# Patient Record
Sex: FEMALE | Race: WHITE | NOT HISPANIC OR LATINO | Employment: OTHER | URBAN - NONMETROPOLITAN AREA
[De-identification: names, ages, dates, MRNs, and addresses within clinical notes are randomized per-mention and may not be internally consistent; named-entity substitution may affect disease eponyms.]

---

## 2024-07-29 ENCOUNTER — OFFICE VISIT (OUTPATIENT)
Dept: ENDOCRINOLOGY | Facility: CLINIC | Age: 54
End: 2024-07-29
Payer: COMMERCIAL

## 2024-07-29 VITALS
BODY MASS INDEX: 21.01 KG/M2 | WEIGHT: 138.6 LBS | SYSTOLIC BLOOD PRESSURE: 119 MMHG | HEART RATE: 83 BPM | DIASTOLIC BLOOD PRESSURE: 68 MMHG | HEIGHT: 68 IN | OXYGEN SATURATION: 99 %

## 2024-07-29 DIAGNOSIS — E10.65 TYPE 1 DIABETES MELLITUS WITH HYPERGLYCEMIA: Primary | ICD-10-CM

## 2024-07-29 LAB
EXPIRATION DATE: ABNORMAL
GLUCOSE BLDC GLUCOMTR-MCNC: 235 MG/DL (ref 70–130)
HBA1C MFR BLD: 10.6 % (ref 4.5–5.7)
Lab: ABNORMAL

## 2024-07-29 PROCEDURE — 83036 HEMOGLOBIN GLYCOSYLATED A1C: CPT | Performed by: NURSE PRACTITIONER

## 2024-07-29 PROCEDURE — 82043 UR ALBUMIN QUANTITATIVE: CPT | Performed by: NURSE PRACTITIONER

## 2024-07-29 PROCEDURE — 1160F RVW MEDS BY RX/DR IN RCRD: CPT | Performed by: NURSE PRACTITIONER

## 2024-07-29 PROCEDURE — 1159F MED LIST DOCD IN RCRD: CPT | Performed by: NURSE PRACTITIONER

## 2024-07-29 PROCEDURE — 3046F HEMOGLOBIN A1C LEVEL >9.0%: CPT | Performed by: NURSE PRACTITIONER

## 2024-07-29 PROCEDURE — 99204 OFFICE O/P NEW MOD 45 MIN: CPT | Performed by: NURSE PRACTITIONER

## 2024-07-29 PROCEDURE — 82947 ASSAY GLUCOSE BLOOD QUANT: CPT | Performed by: NURSE PRACTITIONER

## 2024-07-29 PROCEDURE — 82570 ASSAY OF URINE CREATININE: CPT | Performed by: NURSE PRACTITIONER

## 2024-07-29 RX ORDER — INSULIN GLARGINE 100 [IU]/ML
20 INJECTION, SOLUTION SUBCUTANEOUS NIGHTLY
COMMUNITY

## 2024-07-29 RX ORDER — PRASUGREL 10 MG/1
10 TABLET, FILM COATED ORAL DAILY
COMMUNITY

## 2024-07-29 RX ORDER — ASPIRIN 81 MG/1
81 TABLET ORAL DAILY
COMMUNITY

## 2024-07-29 RX ORDER — SACUBITRIL AND VALSARTAN 97; 103 MG/1; MG/1
1 TABLET, FILM COATED ORAL 2 TIMES DAILY
COMMUNITY

## 2024-07-29 RX ORDER — BLOOD-GLUCOSE SENSOR
EACH MISCELLANEOUS SEE ADMIN INSTRUCTIONS
COMMUNITY
Start: 2024-07-10

## 2024-07-29 RX ORDER — TIZANIDINE 4 MG/1
4 TABLET ORAL NIGHTLY PRN
COMMUNITY

## 2024-07-29 RX ORDER — CARVEDILOL 6.25 MG/1
6.25 TABLET ORAL 2 TIMES DAILY WITH MEALS
COMMUNITY

## 2024-07-29 RX ORDER — GABAPENTIN 300 MG/1
300 CAPSULE ORAL 2 TIMES DAILY
COMMUNITY

## 2024-07-29 RX ORDER — ISOSORBIDE MONONITRATE 30 MG/1
30 TABLET, EXTENDED RELEASE ORAL DAILY
COMMUNITY

## 2024-07-29 RX ORDER — HYDROCODONE BITARTRATE AND ACETAMINOPHEN 7.5; 325 MG/1; MG/1
1 TABLET ORAL EVERY 8 HOURS PRN
COMMUNITY

## 2024-07-29 NOTE — ASSESSMENT & PLAN NOTE
-Diabetes is above goal with A1c 10.6.  -Discussed dietary and exercise guidelines with patient.  -Discussed the importance of yearly eye exams.  -Discussed the importance of checking BG's regularly.    -Discussed the use of insulin pump with CGM with patient.  Discussed that she would likely be more successful with a CGM that is compatible to her pump.  Will reach out to "[employer]" rep to see if she will be able to continue using the system due to her change with insurance.  If she is able to will consider updating pump to 780 G with a sensor that is compatible.  If she is not able to continue with Medtronic due to cost, we will consider placing her on OmniPod with Dexcom system.  Discussed this with patient and she is agreeable.  -Discussed the function of insulin pump as far as basal and bolus features.  -Discontinue Lantus.  -Adjustments to insulin pump settings as below:  Basal:  0000: 1.00 u/hr  0500: 2.0 u/hr  1100: 2 u/hr  1900: 2 u/hr  CR: 12  ISF: 40  -S/S hypoglycemia reviewed with Rule of 15's advised.  -Follow-up in 3 months.

## 2024-07-29 NOTE — PROGRESS NOTES
Chief Complaint   Patient presents with    Diabetes        Referring Provider  Paddy France, DO     HPI   Catherine Peguero is a 53 y.o. female had concerns including Diabetes.    Seen as a new patient.  T1DM.    [place] recently moved here for [place].  She was seeing endo in [place] and was started on a Medtronic 770 G insulin pump prior to moving here.  She reports that she was quickly trained in office and has been doing the best that she can with managing her diabetes with the use of his pump.  When she moved to [place] she started with primary care.  They referred her to Dr. Reyes she was not happy with this as he removed her from the insulin pump and put her back on injections.  She went back to PCP and did not want to continue with injections, but her PCP was not comfortable managing her insulin pump.  They started her on long-acting insulin with insulin pump prior to getting her scheduled to see us for management of her diabetes.  She reports that she has constant high blood sugars and has not been able to get it under control recently.  She reports that when she lived in [place] her  was unable to help her get her insulin supplies due to the cost.  She reports that she now has no income and is not sure that she will be able to continue use with this insulin pump.    Diabetes:  Diabetes was diagnosed at age 25.  Complications include neuropathy.  Last ophtho exam was 2024.  Current medications for diabetes include Lantus 20 units nightly, Humalog in the insulin pump.  She checks her blood sugar Gustabo 3 CGM times per day.   Hypos:rarely    ACE/ARB:yes, Statin: yes  Labs:  Lipid Panel: utd  GABBY: utd    The following portions of the patient's history were reviewed and updated as appropriate: allergies, current medications, past family history, past medical history, past social history, past surgical history, and problem list.    Diet: she tries to limit her carbs and sugars    History reviewed. No pertinent past medical  "history.  History reviewed. No pertinent surgical history.   History reviewed. No pertinent family history.   Social History     Socioeconomic History    Marital status:    Tobacco Use    Smoking status: Former     Types: Cigarettes     Passive exposure: Past    Smokeless tobacco: Never   Substance and Sexual Activity    Alcohol use: Not Currently     Comment: Pt stated has a drink \"3 times a year\"    Drug use: Never    Sexual activity: Defer      No Known Allergies   Current Outpatient Medications on File Prior to Visit   Medication Sig Dispense Refill    aspirin 81 MG EC tablet Take 1 tablet by mouth Daily.      carvedilol (COREG) 6.25 MG tablet Take 1 tablet by mouth 2 (Two) Times a Day With Meals.      Continuous Glucose Sensor (FreeStyle Gustabo 3 Sensor) misc See Admin Instructions.      gabapentin (NEURONTIN) 300 MG capsule Take 1 capsule by mouth 2 (Two) Times a Day.      HYDROcodone-acetaminophen (NORCO) 7.5-325 MG per tablet Take 1 tablet by mouth Every 8 (Eight) Hours As Needed for Moderate Pain.      insulin glargine (LANTUS, SEMGLEE) 100 UNIT/ML injection Inject 20 Units under the skin into the appropriate area as directed Every Night.      insulin regular (humuLIN R,novoLIN R) 100 UNIT/ML injection Inject  under the skin into the appropriate area as directed. Use in Pump   Uses 3mL/every 3 days      isosorbide mononitrate (IMDUR) 30 MG 24 hr tablet Take 1 tablet by mouth Daily.      prasugrel (EFFIENT) 10 MG tablet Take 1 tablet by mouth Daily.      rosuvastatin (CRESTOR) 20 MG tablet Take 40 mg by mouth Every Night.      sacubitril-valsartan (Entresto)  MG tablet Take 1 tablet by mouth 2 (Two) Times a Day.      tiZANidine (ZANAFLEX) 4 MG tablet Take 1 tablet by mouth At Night As Needed for Muscle Spasms.       No current facility-administered medications on file prior to visit.        Review of Systems   Constitutional:  Positive for fatigue. Negative for unexpected weight loss.   Eyes:  " "Positive for blurred vision.   Endocrine: Positive for polydipsia, polyphagia and polyuria.   Psychiatric/Behavioral:  Positive for sleep disturbance.    All other systems reviewed and are negative.    /68 (BP Location: Right arm, Patient Position: Sitting, Cuff Size: Adult)   Pulse 83   Ht 172.7 cm (68\")   Wt 62.9 kg (138 lb 9.6 oz)   SpO2 99%   BMI 21.07 kg/m²      Physical Exam  Vitals reviewed.   Constitutional:       Appearance: Normal appearance.   Eyes:      Extraocular Movements: Extraocular movements intact.   Cardiovascular:      Rate and Rhythm: Normal rate.      Pulses:           Dorsalis pedis pulses are 2+ on the right side and 2+ on the left side.        Posterior tibial pulses are 2+ on the right side and 2+ on the left side.   Pulmonary:      Effort: Pulmonary effort is normal.   Feet:      Right foot:      Protective Sensation: 10 sites tested.  10 sites sensed.      Skin integrity: Callus and dry skin present.      Toenail Condition: Right toenails are normal.      Left foot:      Protective Sensation: 10 sites tested.  10 sites sensed.      Skin integrity: Callus and dry skin present.      Toenail Condition: Left toenails are normal.      Comments: Diabetic Foot Exam Performed and Monofilament Test Performed  Neurological:      General: No focal deficit present.      Mental Status: She is alert and oriented to person, place, and time.   Psychiatric:         Mood and Affect: Mood normal.         Behavior: Behavior normal.         Thought Content: Thought content normal.         Judgment: Judgment normal.       CMP:     Lipid Panel:  No results found for: \"CHOL\", \"TRIG\", \"HDL\", \"VLDL\", \"LDL\"  HbA1c:  Lab Results   Component Value Date    HGBA1C 10.6 (A) 07/29/2024     Glucose:  Lab Results   Component Value Date    POCGLU 235 (A) 07/29/2024     Microalbumin:  No results found for: \"MALBCRERATIO\"  TSH:  No results found for: \"TSH\"    Assessment and Plan    Diagnoses and all orders for " this visit:    1. Type 1 diabetes mellitus with hyperglycemia (Primary)  Assessment & Plan:  -Diabetes is above goal with A1c 10.6.  -Discussed dietary and exercise guidelines with patient.  -Discussed the importance of yearly eye exams.  -Discussed the importance of checking BG's regularly.    -Discussed the use of insulin pump with CGM with patient.  Discussed that she would likely be more successful with a CGM that is compatible to her pump.  Will reach out to [employer] rep to see if she will be able to continue using the system due to her change with insurance.  If she is able to will consider updating pump to 780 G with a sensor that is compatible.  If she is not able to continue with Medtronic due to cost, we will consider placing her on OmniPod with Dexcom system.  Discussed this with patient and she is agreeable.  -Discussed the function of insulin pump as far as basal and bolus features.  -Discontinue Lantus.  -Adjustments to insulin pump settings as below:  Basal:  0000: 1.00 u/hr  0500: 2.0 u/hr  1100: 2 u/hr  1900: 2 u/hr  CR: 12  ISF: 40  -S/S hypoglycemia reviewed with Rule of 15's advised.  -Follow-up in 3 months.    Orders:  -     POC Glycosylated Hemoglobin (Hb A1C)  -     POC Glucose, Blood  -     Microalbumin / Creatinine Urine Ratio - Urine, Clean Catch         Return in about 3 months (around 10/29/2024) for Follow-up appointment, A1C. The patient was instructed to contact the clinic with any interval questions or concerns.        This document has been electronically signed by KALE Harper  July 29, 2024 13:44 EDT   Endocrinology    Please note that portions of this document were completed with a voice recognition program. Efforts were made to edit the dictations, but occasionally words are mis-transcribed.

## 2024-07-30 LAB
ALBUMIN UR-MCNC: 2.5 MG/DL
CREAT UR-MCNC: 120.3 MG/DL
MICROALBUMIN/CREAT UR: 20.8 MG/G (ref 0–29)

## 2024-12-03 ENCOUNTER — OFFICE VISIT (OUTPATIENT)
Dept: ENDOCRINOLOGY | Facility: CLINIC | Age: 54
End: 2024-12-03
Payer: COMMERCIAL

## 2024-12-03 VITALS
HEART RATE: 83 BPM | SYSTOLIC BLOOD PRESSURE: 125 MMHG | BODY MASS INDEX: 21.26 KG/M2 | WEIGHT: 139.8 LBS | DIASTOLIC BLOOD PRESSURE: 49 MMHG | OXYGEN SATURATION: 97 %

## 2024-12-03 DIAGNOSIS — E10.65 TYPE 1 DIABETES MELLITUS WITH HYPERGLYCEMIA: Primary | ICD-10-CM

## 2024-12-03 LAB
EXPIRATION DATE: ABNORMAL
HBA1C MFR BLD: 9.8 % (ref 4.5–5.7)
Lab: ABNORMAL
T3FREE SERPL-MCNC: 2.98 PG/ML (ref 2–4.4)
T4 FREE SERPL-MCNC: 0.99 NG/DL (ref 0.92–1.68)
TSH SERPL DL<=0.05 MIU/L-ACNC: 0.08 UIU/ML (ref 0.27–4.2)

## 2024-12-03 PROCEDURE — 84439 ASSAY OF FREE THYROXINE: CPT | Performed by: NURSE PRACTITIONER

## 2024-12-03 PROCEDURE — 84443 ASSAY THYROID STIM HORMONE: CPT | Performed by: NURSE PRACTITIONER

## 2024-12-03 PROCEDURE — 86376 MICROSOMAL ANTIBODY EACH: CPT | Performed by: NURSE PRACTITIONER

## 2024-12-03 PROCEDURE — 84445 ASSAY OF TSI GLOBULIN: CPT | Performed by: NURSE PRACTITIONER

## 2024-12-03 PROCEDURE — 84481 FREE ASSAY (FT-3): CPT | Performed by: NURSE PRACTITIONER

## 2024-12-03 PROCEDURE — 86800 THYROGLOBULIN ANTIBODY: CPT | Performed by: NURSE PRACTITIONER

## 2024-12-03 PROCEDURE — 83520 IMMUNOASSAY QUANT NOS NONAB: CPT | Performed by: NURSE PRACTITIONER

## 2024-12-03 RX ORDER — ACYCLOVIR 400 MG/1
1 TABLET ORAL
Qty: 3 EACH | Refills: 5 | Status: SHIPPED | OUTPATIENT
Start: 2024-12-03

## 2024-12-03 RX ORDER — INSULIN PMP CART,AUT,G6/7,CNTR
1 EACH SUBCUTANEOUS TAKE AS DIRECTED
Qty: 1 KIT | Refills: 0 | Status: SHIPPED | OUTPATIENT
Start: 2024-12-03

## 2024-12-03 RX ORDER — GLUCAGON 1 MG
1 KIT INJECTION AS NEEDED
Qty: 1 EACH | Refills: 2 | Status: SHIPPED | OUTPATIENT
Start: 2024-12-03

## 2024-12-03 RX ORDER — INSULIN LISPRO 100 [IU]/ML
INJECTION, SOLUTION INTRAVENOUS; SUBCUTANEOUS
Qty: 30 ML | Refills: 5 | Status: SHIPPED | OUTPATIENT
Start: 2024-12-03

## 2024-12-03 NOTE — PROGRESS NOTES
Chief Complaint   Patient presents with    Diabetes     F/u dm1, wanted the ominipod instead of medtronic.        Referring Provider  No ref. provider found     HPI   Catherine Peguero is a 54 y.o. female had concerns including Diabetes (F/u dm1, wanted the ominipod instead of medtronic.).     T1DM.    She reports that she is currently using a Medtronic pump with Gustabo CGM.  She would like to transition to Omnipod insulin pump so that she does not have to continuing with tubing and would like have a system that connects to help better control her BG's.    She is complaining of pain, grittiness, redness, watering, dryness and swelling to the left eye. She reports that this has been like this for the last year or so.    History:  [place] recently moved here for [place].  She was seeing [facility] in [place] and was started on a Medtronic 770 G insulin pump prior to moving here.  She reports that she was quickly trained in office and has been doing the best that she can with managing her diabetes with the use of his pump.  When she moved to [place] she started with primary care.  They referred her to Dr. Reyes she was not happy with this as he removed her from the insulin pump and put her back on injections.  She went back to PCP and did not want to continue with injections, but her PCP was not comfortable managing her insulin pump.  They started her on long-acting insulin with insulin pump prior to getting her scheduled to see us for management of her diabetes.  She reports that she has constant high blood sugars and has not been able to get it under control recently.  She reports that when she lived in [place] her  was unable to help her get her insulin supplies due to the cost.  She reports that she now has no income and is not sure that she will be able to continue use with this insulin pump.    Diabetes:  Diabetes was diagnosed at age 25.  Complications include neuropathy.  Last ophtho exam was 2024.  Current medications for diabetes  "include insulin in a Medtronic insulin pump.  She checks her blood sugar Gustabo 3 CGM times per day.   Hypos:rarely    ACE/ARB:yes, Statin: yes  Labs:  Lipid Panel: utd  GABBY: utd    The following portions of the patient's history were reviewed and updated as appropriate: allergies, current medications, past family history, past medical history, past social history, past surgical history, and problem list.    Diet: she tries to limit her carbs and sugars    History reviewed. No pertinent past medical history.  History reviewed. No pertinent surgical history.   Family History   Family history unknown: Yes      Social History     Socioeconomic History    Marital status:    Tobacco Use    Smoking status: Former     Types: Cigarettes     Passive exposure: Past    Smokeless tobacco: Never   Vaping Use    Vaping status: Never Used   Substance and Sexual Activity    Alcohol use: Not Currently     Comment: Pt stated has a drink \"3 times a year\"    Drug use: Never    Sexual activity: Defer      No Known Allergies   Current Outpatient Medications on File Prior to Visit   Medication Sig Dispense Refill    carvedilol (COREG) 6.25 MG tablet Take 1 tablet by mouth 2 (Two) Times a Day With Meals.      Continuous Glucose Sensor (FreeStyle Gustabo 3 Sensor) misc See Admin Instructions.      gabapentin (NEURONTIN) 300 MG capsule Take 1 capsule by mouth 2 (Two) Times a Day.      HYDROcodone-acetaminophen (NORCO) 7.5-325 MG per tablet Take 1 tablet by mouth Every 8 (Eight) Hours As Needed for Moderate Pain.      insulin regular (humuLIN R,novoLIN R) 100 UNIT/ML injection Inject  under the skin into the appropriate area as directed. Use in Pump   Uses 3mL/every 3 days      isosorbide mononitrate (IMDUR) 30 MG 24 hr tablet Take 1 tablet by mouth Daily.      prasugrel (EFFIENT) 10 MG tablet Take 1 tablet by mouth Daily.      rosuvastatin (CRESTOR) 20 MG tablet Take 40 mg by mouth Every Night.      sacubitril-valsartan (Entresto)  " "MG tablet Take 1 tablet by mouth 2 (Two) Times a Day.      tiZANidine (ZANAFLEX) 4 MG tablet Take 1 tablet by mouth At Night As Needed for Muscle Spasms.      aspirin 81 MG EC tablet Take 1 tablet by mouth Daily. (Patient not taking: Reported on 12/3/2024)      insulin glargine (LANTUS, SEMGLEE) 100 UNIT/ML injection Inject 20 Units under the skin into the appropriate area as directed Every Night. (Patient not taking: Reported on 12/3/2024)       No current facility-administered medications on file prior to visit.        Review of Systems   Constitutional:  Positive for fatigue. Negative for unexpected weight loss.   Eyes:  Positive for blurred vision, pain, discharge and itching.   Endocrine: Positive for polydipsia, polyphagia and polyuria.   Psychiatric/Behavioral:  Positive for sleep disturbance.    All other systems reviewed and are negative.    /49 (BP Location: Right arm, Patient Position: Sitting, Cuff Size: Adult)   Pulse 83   Wt 63.4 kg (139 lb 12.8 oz)   SpO2 97%   Breastfeeding No   BMI 21.26 kg/m²      Physical Exam  Vitals reviewed.   Constitutional:       Appearance: Normal appearance.   Eyes:      Extraocular Movements: Extraocular movements intact.   Cardiovascular:      Rate and Rhythm: Normal rate.   Pulmonary:      Effort: Pulmonary effort is normal.   Neurological:      General: No focal deficit present.      Mental Status: She is alert and oriented to person, place, and time.   Psychiatric:         Mood and Affect: Mood normal.         Behavior: Behavior normal.         Thought Content: Thought content normal.         Judgment: Judgment normal.       CMP:  Lab Results   Component Value Date    Glucose 235 (A) 07/29/2024    Glucose 128 (H) 01/17/2023    Creatinine, Urine 120.3 07/29/2024     Lipid Panel:  No results found for: \"CHOL\", \"TRIG\", \"HDL\", \"VLDL\", \"LDL\"  HbA1c:  Lab Results   Component Value Date    HGBA1C 9.8 (A) 12/03/2024     Glucose:  Lab Results   Component Value Date " "   POCGLU 235 (A) 07/29/2024     Microalbumin:  Lab Results   Component Value Date    CHRIS 20.8 07/29/2024     TSH:  No results found for: \"TSH\"    Assessment and Plan    Diagnoses and all orders for this visit:    1. Type 1 diabetes mellitus with hyperglycemia (Primary)  Assessment & Plan:  -Diabetes is above goal with A1c 9.8.  -Discussed dietary and exercise guidelines with patient.  -Discussed the importance of yearly eye exams.  -Discussed the importance of checking BG's regularly.    -Discussed the use of insulin pump with CGM with patient. She would like to start using Omnipod with Dexcom G7.  2 week data download Gustabo CGM:  2 week data download is as follows:  Very High: 54%  High:18%  In Range:25%  Low: 2%  Very Low: 1%  Trend shows overall hypers with post meal hypers.  -Continue with insulin pump settings as below:  Basal:  0000: 1.00 u/hr  0500: 2.0 u/hr  1100: 2 u/hr  1900: 2 u/hr  CR: 12  ISF: 40  -RX for Omnipod and Dexcom sent to pharmacy.  Will send information to [employer]  to have trained and started on this system.  -S/S hypoglycemia reviewed with Rule of 15's advised.  -Follow-up in 3 months.    Orders:  -     POC Glycosylated Hemoglobin (Hb A1C)  -     Insulin Disposable Pump (Omnipod 5 [id number] Intro Gen 5) kit; Use 1 kit Take As Directed.  Dispense: 1 kit; Refill: 0  -     Continuous Glucose Sensor (Dexcom G7 Sensor) misc; Use 1 each Every 10 (Ten) Days.  Dispense: 3 each; Refill: 5  -     TSH  -     T4, free  -     T3, Free  -     Thyroid Stimulating Immunoglobulin  -     Thyrotropin Receptor Antibody  -     Thyroid Antibodies    Other orders  -     Insulin Lispro (HumaLOG) 100 UNIT/ML injection; For use in insulin pump.    Dispense: 30 mL; Refill: 5  -     Glucagon HCl (Glucagon Emergency) 1 MG/ML reconstituted solution; Inject 1 mL as directed As Needed (for severe hypoglycemia).  Dispense: 1 each; Refill: 2         Return in about 3 months (around 3/3/2025) for " Follow-up appointment, A1C. The patient was instructed to contact the clinic with any interval questions or concerns.        This document has been electronically signed by KALE Harper  December 3, 2024 12:55 EST   Endocrinology    Please note that portions of this document were completed with a voice recognition program. Efforts were made to edit the dictations, but occasionally words are mis-transcribed.

## 2024-12-03 NOTE — ASSESSMENT & PLAN NOTE
-Diabetes is above goal with A1c 9.8.  -Discussed dietary and exercise guidelines with patient.  -Discussed the importance of yearly eye exams.  -Discussed the importance of checking BG's regularly.    -Discussed the use of insulin pump with CGM with patient. She would like to start using Omnipod with Dexcom G7.  2 week data download Gustabo CGM:  2 week data download is as follows:  Very High: 54%  High:18%  In Range:25%  Low: 2%  Very Low: 1%  Trend shows overall hypers with post meal hypers.  -Continue with insulin pump settings as below:  Basal:  0000: 1.00 u/hr  0500: 2.0 u/hr  1100: 2 u/hr  1900: 2 u/hr  CR: 12  ISF: 40  -RX for Omnipod and Dexcom sent to pharmacy.  Will send information to [employer]  to have trained and started on this system.  -S/S hypoglycemia reviewed with Rule of 15's advised.  -Follow-up in 3 months.

## 2024-12-04 ENCOUNTER — TELEPHONE (OUTPATIENT)
Dept: ENDOCRINOLOGY | Facility: CLINIC | Age: 54
End: 2024-12-04

## 2024-12-04 LAB
THYROGLOB AB SERPL-ACNC: <1 IU/ML (ref 0–0.9)
THYROPEROXIDASE AB SERPL-ACNC: 20 IU/ML (ref 0–34)
TSH RECEP AB SER-ACNC: <1.1 IU/L (ref 0–1.75)

## 2024-12-04 NOTE — TELEPHONE ENCOUNTER
Please let her know that the Omnipod trainers make their own schedule and they will contact her with this information.  Will you check to make sure she is on [employer]'s list?

## 2024-12-04 NOTE — TELEPHONE ENCOUNTER
Caller: Catherine Peguero    Relationship to patient: Self    Best call back number: [phone]     Patient is needing: PT REQUESTS EDUCATION VISIT FOR OMNIPOD 5 ON 12/4-12/5/24 IN [place] OFFICE. PLEASE CALL TO ADVISE.

## 2024-12-05 LAB — TSI SER-ACNC: <0.1 IU/L (ref 0–0.55)

## 2024-12-06 DIAGNOSIS — R79.89 LOW TSH LEVEL: Primary | ICD-10-CM

## 2024-12-06 DIAGNOSIS — E05.90 HYPERTHYROIDISM: ICD-10-CM

## 2025-01-07 DIAGNOSIS — E10.65 TYPE 1 DIABETES MELLITUS WITH HYPERGLYCEMIA: ICD-10-CM

## 2025-01-07 RX ORDER — INSULIN LISPRO 100 [IU]/ML
INJECTION, SOLUTION INTRAVENOUS; SUBCUTANEOUS
Qty: 30 ML | Refills: 5 | Status: SHIPPED | OUTPATIENT
Start: 2025-01-07

## 2025-01-07 RX ORDER — ACYCLOVIR 400 MG/1
1 TABLET ORAL
Qty: 3 EACH | Refills: 5 | Status: SHIPPED | OUTPATIENT
Start: 2025-01-07

## 2025-01-24 ENCOUNTER — HOSPITAL ENCOUNTER (OUTPATIENT)
Dept: ULTRASOUND IMAGING | Facility: HOSPITAL | Age: 55
Discharge: HOME | End: 2025-01-24
Payer: COMMERCIAL

## 2025-01-24 PROCEDURE — 76536 US EXAM OF HEAD AND NECK: CPT

## 2025-01-24 PROCEDURE — 76536 US EXAM OF HEAD AND NECK: CPT | Performed by: RADIOLOGY

## 2025-01-29 DIAGNOSIS — R79.89 LOW TSH LEVEL: Primary | ICD-10-CM

## 2025-01-29 DIAGNOSIS — E04.1 SOLITARY THYROID NODULE: ICD-10-CM

## 2025-02-05 DIAGNOSIS — E10.65 TYPE 1 DIABETES MELLITUS WITH HYPERGLYCEMIA: ICD-10-CM

## 2025-02-05 RX ORDER — INSULIN PMP CART,AUT,G6/7,CNTR
1 EACH SUBCUTANEOUS TAKE AS DIRECTED
Qty: 15 EACH | Refills: 0 | Status: SHIPPED | OUTPATIENT
Start: 2025-02-05

## 2025-03-18 ENCOUNTER — HOSPITAL ENCOUNTER (OUTPATIENT)
Dept: NUCLEAR MEDICINE | Facility: HOSPITAL | Age: 55
Discharge: HOME | End: 2025-03-18
Payer: COMMERCIAL

## 2025-03-18 PROCEDURE — A9516 IODINE I-123 SOD IODIDE MIC: HCPCS | Performed by: NURSE PRACTITIONER

## 2025-03-18 PROCEDURE — 78014 THYROID IMAGING W/BLOOD FLOW: CPT

## 2025-03-18 PROCEDURE — 34310000005 SODIUM IODIDE 7.4 MBQ CAPSULE: Performed by: NURSE PRACTITIONER

## 2025-03-18 RX ORDER — SODIUM IODIDE I 123 200 UCI/1
1 CAPSULE, GELATIN COATED ORAL
Status: COMPLETED | OUTPATIENT
Start: 2025-03-18 | End: 2025-03-18

## 2025-03-18 RX ADMIN — Medication 1 CAPSULE: at 09:29

## 2025-03-19 ENCOUNTER — HOSPITAL ENCOUNTER (OUTPATIENT)
Dept: NUCLEAR MEDICINE | Facility: HOSPITAL | Age: 55
Discharge: HOME | End: 2025-03-19
Payer: COMMERCIAL

## 2025-03-20 DIAGNOSIS — E10.65 TYPE 1 DIABETES MELLITUS WITH HYPERGLYCEMIA: Primary | ICD-10-CM

## 2025-04-23 ENCOUNTER — OFFICE VISIT (OUTPATIENT)
Dept: ENDOCRINOLOGY | Facility: CLINIC | Age: 55
End: 2025-04-23
Payer: COMMERCIAL

## 2025-04-23 VITALS
SYSTOLIC BLOOD PRESSURE: 156 MMHG | HEART RATE: 68 BPM | WEIGHT: 140.2 LBS | BODY MASS INDEX: 21.32 KG/M2 | DIASTOLIC BLOOD PRESSURE: 78 MMHG | OXYGEN SATURATION: 100 %

## 2025-04-23 DIAGNOSIS — E10.65 TYPE 1 DIABETES MELLITUS WITH HYPERGLYCEMIA: Primary | ICD-10-CM

## 2025-04-23 LAB
EXPIRATION DATE: ABNORMAL
HBA1C MFR BLD: 7.5 % (ref 4.5–5.7)
Lab: ABNORMAL

## 2025-04-23 PROCEDURE — 80053 COMPREHEN METABOLIC PANEL: CPT | Performed by: NURSE PRACTITIONER

## 2025-04-23 PROCEDURE — 82043 UR ALBUMIN QUANTITATIVE: CPT | Performed by: NURSE PRACTITIONER

## 2025-04-23 PROCEDURE — 82570 ASSAY OF URINE CREATININE: CPT | Performed by: NURSE PRACTITIONER

## 2025-04-23 PROCEDURE — 84439 ASSAY OF FREE THYROXINE: CPT | Performed by: NURSE PRACTITIONER

## 2025-04-23 PROCEDURE — 80061 LIPID PANEL: CPT | Performed by: NURSE PRACTITIONER

## 2025-04-23 PROCEDURE — 84443 ASSAY THYROID STIM HORMONE: CPT | Performed by: NURSE PRACTITIONER

## 2025-04-23 RX ORDER — NICOTINE 21 MG/24HR
1 PATCH, TRANSDERMAL 24 HOURS TRANSDERMAL EVERY 24 HOURS
Qty: 30 EACH | Refills: 0 | Status: SHIPPED | OUTPATIENT
Start: 2025-04-23

## 2025-04-23 RX ORDER — METOPROLOL TARTRATE 25 MG/1
25 TABLET, FILM COATED ORAL 2 TIMES DAILY
COMMUNITY

## 2025-04-23 RX ORDER — VALSARTAN 320 MG/1
1 TABLET ORAL DAILY
COMMUNITY
Start: 2025-04-17

## 2025-04-23 RX ORDER — INSULIN LISPRO 100 [IU]/ML
INJECTION, SOLUTION INTRAVENOUS; SUBCUTANEOUS
Qty: 30 ML | Refills: 5 | Status: SHIPPED | OUTPATIENT
Start: 2025-04-23

## 2025-04-23 NOTE — PROGRESS NOTES
Chief Complaint   Patient presents with    Follow-up     Type 1 diabetes mellitus with hyperglycemia            Referring Provider  No ref. provider found     HPI   Catherine Peguero is a 54 y.o. female had concerns including Follow-up (Type 1 diabetes mellitus with hyperglycemia//).     T1DM.    She has switched from Medtronic to Omnipod insulin pump.  She reports that she does not feel like she received adequate training on the pump.  She is using to the best of her ability.  She notes that she has some higher than normal blood sugars but reports that she also likes pasta and breads and eats those often.  Current insulin pump settings:  Basal: 12A: 1.5 u/hr  CR: 1:12  ISF: 30  BG Target Range: 110  BG Correction Threshold: 150    History:  [place] recently moved here for [place].  She was seeing [facility] in [place] and was started on a Medtronic 770 G insulin pump prior to moving here.  She reports that she was quickly trained in office and has been doing the best that she can with managing her diabetes with the use of his pump.  When she moved to [place] she started with primary care.  They referred her to Dr. Reyes she was not happy with this as he removed her from the insulin pump and put her back on injections.  She went back to PCP and did not want to continue with injections, but her PCP was not comfortable managing her insulin pump.  They started her on long-acting insulin with insulin pump prior to getting her scheduled to see us for management of her diabetes.  She reports that she has constant high blood sugars and has not been able to get it under control recently.  She reports that when she lived in [place] her  was unable to help her get her insulin supplies due to the cost.  She reports that she now has no income and is not sure that she will be able to continue use with this insulin pump.    Diabetes:  Diabetes was diagnosed at age 25.  Complications include neuropathy.  Last ophtho exam was 2024.  Current medications  "for diabetes include insulin in a Medtronic insulin pump.  She checks her blood sugar Gustabo 3 CGM times per day.   Hypos:rarely    ACE/ARB:yes, Statin: yes  Labs:  Lipid Panel: utd  GABBY: utd    The following portions of the patient's history were reviewed and updated as appropriate: allergies, current medications, past family history, past medical history, past social history, past surgical history, and problem list.    Diet: she tries to limit her carbs and sugars    History reviewed. No pertinent past medical history.  History reviewed. No pertinent surgical history.   Family History   Family history unknown: Yes      Social History     Socioeconomic History    Marital status:    Tobacco Use    Smoking status: Some Days     Types: Cigarettes     Passive exposure: Current    Smokeless tobacco: Never    Tobacco comments:     Would like to get patches to stop smoking again   Vaping Use    Vaping status: Never Used   Substance and Sexual Activity    Alcohol use: Not Currently     Comment: Pt stated has a drink \"3 times a year\"    Drug use: Never    Sexual activity: Defer      No Known Allergies   Current Outpatient Medications on File Prior to Visit   Medication Sig Dispense Refill    carvedilol (COREG) 6.25 MG tablet Take 1 tablet by mouth 2 (Two) Times a Day With Meals.      Continuous Glucose Sensor (Dexcom G7 Sensor) misc Use 1 each Every 10 (Ten) Days. 3 each 5    gabapentin (NEURONTIN) 300 MG capsule Take 1 capsule by mouth 2 (Two) Times a Day.      Glucagon HCl (Glucagon Emergency) 1 MG/ML reconstituted solution Inject 1 mL as directed As Needed (for severe hypoglycemia). 1 each 2    HYDROcodone-acetaminophen (NORCO) 7.5-325 MG per tablet Take 1 tablet by mouth Every 8 (Eight) Hours As Needed for Moderate Pain.      Insulin Disposable Pump (Omnipod 5 [id number] Pods Gen 5) misc USE TO INJECT INSULIN AS DIRECTED 15 each 0    insulin regular (humuLIN R,novoLIN R) 100 UNIT/ML injection Inject  under the skin " into the appropriate area as directed. Use in Pump   Uses 3mL/every 3 days      isosorbide mononitrate (IMDUR) 30 MG 24 hr tablet Take 1 tablet by mouth Daily.      metoprolol tartrate (LOPRESSOR) 25 MG tablet Take 1 tablet by mouth 2 (Two) Times a Day.      prasugrel (EFFIENT) 10 MG tablet Take 1 tablet by mouth Daily.      rosuvastatin (CRESTOR) 20 MG tablet Take 40 mg by mouth Every Night.      tiZANidine (ZANAFLEX) 4 MG tablet Take 1 tablet by mouth At Night As Needed for Muscle Spasms.      valsartan (DIOVAN) 320 MG tablet Take 1 tablet by mouth Daily.      [DISCONTINUED] Insulin Lispro (HumaLOG) 100 UNIT/ML injection For use in insulin pump.   30 mL 5    aspirin 81 MG EC tablet Take 1 tablet by mouth Daily. (Patient not taking: Reported on 4/23/2025)      insulin glargine (LANTUS, SEMGLEE) 100 UNIT/ML injection Inject 20 Units under the skin into the appropriate area as directed Every Night. (Patient not taking: Reported on 4/23/2025)      [DISCONTINUED] sacubitril-valsartan (Entresto)  MG tablet Take 1 tablet by mouth 2 (Two) Times a Day. (Patient not taking: Reported on 4/23/2025)       No current facility-administered medications on file prior to visit.        Review of Systems   Constitutional:  Positive for fatigue. Negative for unexpected weight loss.   Eyes:  Positive for blurred vision, pain, discharge and itching.   Endocrine: Positive for polydipsia, polyphagia and polyuria.   Psychiatric/Behavioral:  Positive for sleep disturbance.    All other systems reviewed and are negative.    /78 (BP Location: Right arm, Patient Position: Sitting, Cuff Size: Adult)   Pulse 68   Wt 63.6 kg (140 lb 3.2 oz)   SpO2 100%   BMI 21.32 kg/m²      Physical Exam  Vitals reviewed.   Constitutional:       Appearance: Normal appearance.   Eyes:      Extraocular Movements: Extraocular movements intact.   Cardiovascular:      Rate and Rhythm: Normal rate.   Pulmonary:      Effort: Pulmonary effort is  "normal.   Neurological:      General: No focal deficit present.      Mental Status: She is alert and oriented to person, place, and time.   Psychiatric:         Mood and Affect: Mood normal.         Behavior: Behavior normal.         Thought Content: Thought content normal.         Judgment: Judgment normal.       CMP:  Lab Results   Component Value Date    Glucose 269 (H) 12/30/2024    Creatinine, Urine 120.3 07/29/2024     Lipid Panel:  No results found for: \"CHOL\", \"TRIG\", \"HDL\", \"VLDL\", \"LDL\"  HbA1c:  Lab Results   Component Value Date    HGBA1C 7.5 (A) 04/23/2025     Glucose:  Lab Results   Component Value Date    POCGLU 269 (H) 12/30/2024     Microalbumin:  Lab Results   Component Value Date    MALBCRERATIO 20.8 07/29/2024     TSH:  Lab Results   Component Value Date    TSH 0.080 (L) 12/03/2024       Assessment and Plan    Diagnoses and all orders for this visit:    1. Type 1 diabetes mellitus with hyperglycemia (Primary)  Assessment & Plan:  -Diabetes is improving with A1c down from 9.8 to 7.5.  -Discussed dietary and exercise guidelines with patient.  -Discussed the importance of yearly eye exams.  -Discussed the importance of checking BG's regularly.    -Discussed the use of insulin pump with CGM with patient. She would like to start using Omnipod with Dexcom G7.  2 week data download Gustabo CGM:  2 week data download is as follows:  Very High: 20%  High:24%  In Range:54%  Low: 1%  Very Low: 1%  Trend shows overall hypers with post meal hypers.  -Continue with insulin pump settings as below:  Basal: 12A: 1.5 u/hr  CR: 1:12  ISF: 30  BG Target Range: 110  BG Correction Threshold: 110  -Discussed use of Glucagon and appropriate time of use.  -S/S hypoglycemia reviewed with Rule of 15's advised.  -Follow-up in 3 months.    Orders:  -     POC Glycosylated Hemoglobin (Hb A1C)  -     Comprehensive Metabolic Panel  -     Lipid Panel  -     Microalbumin / Creatinine Urine Ratio - Urine, Clean Catch  -     TSH  -     " T4, free    Other orders  -     Insulin Lispro (HumaLOG) 100 UNIT/ML injection; For use in insulin pump.    Dispense: 30 mL; Refill: 5  -     Discontinue: nicotine (NICODERM CQ) 7 MG/24HR patch; Place 1 patch on the skin as directed by provider Daily.  Dispense: 30 each; Refill: 0  -     nicotine (NICODERM CQ) 21 MG/24HR patch; Place 1 patch on the skin as directed by provider Daily.  Dispense: 30 each; Refill: 0         Return in about 3 months (around 7/23/2025) for Follow-up appointment, A1C. The patient was instructed to contact the clinic with any interval questions or concerns.        This document has been electronically signed by KALE Harper  April 23, 2025 14:35 EDT   Endocrinology    Please note that portions of this document were completed with a voice recognition program. Efforts were made to edit the dictations, but occasionally words are mis-transcribed.

## 2025-04-23 NOTE — ASSESSMENT & PLAN NOTE
-Diabetes is improving with A1c down from 9.8 to 7.5.  -Discussed dietary and exercise guidelines with patient.  -Discussed the importance of yearly eye exams.  -Discussed the importance of checking BG's regularly.    -Discussed the use of insulin pump with CGM with patient. She would like to start using Omnipod with Dexcom G7.  2 week data download Gustabo CGM:  2 week data download is as follows:  Very High: 20%  High:24%  In Range:54%  Low: 1%  Very Low: 1%  Trend shows overall hypers with post meal hypers.  -Continue with insulin pump settings as below:  Basal: 12A: 1.5 u/hr  CR: 1:12  ISF: 30  BG Target Range: 110  BG Correction Threshold: 110  -Discussed use of Glucagon and appropriate time of use.  -S/S hypoglycemia reviewed with Rule of 15's advised.  -Follow-up in 3 months.

## 2025-04-24 LAB
ALBUMIN SERPL-MCNC: 4.1 G/DL (ref 3.5–5.2)
ALBUMIN UR-MCNC: <1.2 MG/DL
ALBUMIN/GLOB SERPL: 1.8 G/DL
ALP SERPL-CCNC: 75 U/L (ref 39–117)
ALT SERPL W P-5'-P-CCNC: 19 U/L (ref 1–33)
ANION GAP SERPL CALCULATED.3IONS-SCNC: 8 MMOL/L (ref 5–15)
AST SERPL-CCNC: 23 U/L (ref 1–32)
BILIRUB SERPL-MCNC: 0.3 MG/DL (ref 0–1.2)
BUN SERPL-MCNC: 12 MG/DL (ref 6–20)
BUN/CREAT SERPL: 18.5 (ref 7–25)
CALCIUM SPEC-SCNC: 9.2 MG/DL (ref 8.6–10.5)
CHLORIDE SERPL-SCNC: 105 MMOL/L (ref 98–107)
CHOLEST SERPL-MCNC: 129 MG/DL (ref 0–200)
CO2 SERPL-SCNC: 25 MMOL/L (ref 22–29)
CREAT SERPL-MCNC: 0.65 MG/DL (ref 0.57–1)
CREAT UR-MCNC: 52.9 MG/DL
EGFRCR SERPLBLD CKD-EPI 2021: 103.5 ML/MIN/1.73
GLOBULIN UR ELPH-MCNC: 2.3 GM/DL
GLUCOSE SERPL-MCNC: 281 MG/DL (ref 65–99)
HDLC SERPL-MCNC: 62 MG/DL (ref 40–60)
LDLC SERPL CALC-MCNC: 55 MG/DL (ref 0–100)
LDLC/HDLC SERPL: 0.9 {RATIO}
MICROALBUMIN/CREAT UR: NORMAL MG/G{CREAT}
POTASSIUM SERPL-SCNC: 4.1 MMOL/L (ref 3.5–5.2)
PROT SERPL-MCNC: 6.4 G/DL (ref 6–8.5)
SODIUM SERPL-SCNC: 138 MMOL/L (ref 136–145)
T4 FREE SERPL-MCNC: 0.99 NG/DL (ref 0.92–1.68)
TRIGL SERPL-MCNC: 55 MG/DL (ref 0–150)
TSH SERPL DL<=0.05 MIU/L-ACNC: 0.17 UIU/ML (ref 0.27–4.2)
VLDLC SERPL-MCNC: 12 MG/DL (ref 5–40)

## 2025-04-25 DIAGNOSIS — E10.65 TYPE 1 DIABETES MELLITUS WITH HYPERGLYCEMIA: ICD-10-CM

## 2025-04-25 NOTE — TELEPHONE ENCOUNTER
Caller: Catherine Peguero    Relationship: Self    Best call back number: [phone]    Requested Prescriptions:   Requested Prescriptions     Pending Prescriptions Disp Refills    Insulin Disposable Pump (Omnipod 5 [id number] Pods Gen 5) misc 15 each 0     Si each by Other route Take As Directed.        Pharmacy where request should be sent:    [address] - [phone]  - [phone] FX       Last office visit with prescribing clinician: 2025   Last telemedicine visit with prescribing clinician: Visit date not found   Next office visit with prescribing clinician: 2025     Does the patient have less than a 3 day supply:  [] Yes  [] No    Would you like a call back once the refill request has been completed: [] Yes [] No    If the office needs to give you a call back, can they leave a voicemail: [] Yes [] No    Dilma Perez Rep   25 13:44 EDT

## 2025-04-29 RX ORDER — INSULIN PMP CART,AUT,G6/7,CNTR
1 EACH SUBCUTANEOUS TAKE AS DIRECTED
Qty: 15 EACH | Refills: 0 | Status: SHIPPED | OUTPATIENT
Start: 2025-04-29 | End: 2025-04-30 | Stop reason: SDUPTHER

## 2025-04-30 DIAGNOSIS — E10.65 TYPE 1 DIABETES MELLITUS WITH HYPERGLYCEMIA: ICD-10-CM

## 2025-05-01 RX ORDER — INSULIN PMP CART,AUT,G6/7,CNTR
1 EACH SUBCUTANEOUS TAKE AS DIRECTED
Qty: 15 EACH | Refills: 0 | Status: SHIPPED | OUTPATIENT
Start: 2025-05-01

## 2025-05-01 RX ORDER — INSULIN LISPRO 100 [IU]/ML
INJECTION, SOLUTION INTRAVENOUS; SUBCUTANEOUS
Qty: 30 ML | Refills: 5 | Status: SHIPPED | OUTPATIENT
Start: 2025-05-01

## 2025-05-01 RX ORDER — ACYCLOVIR 400 MG/1
1 TABLET ORAL
Qty: 3 EACH | Refills: 5 | Status: SHIPPED | OUTPATIENT
Start: 2025-05-01

## 2025-05-01 RX ORDER — NICOTINE 21 MG/24HR
1 PATCH, TRANSDERMAL 24 HOURS TRANSDERMAL EVERY 24 HOURS
Qty: 30 EACH | Refills: 0 | Status: SHIPPED | OUTPATIENT
Start: 2025-05-01

## 2025-06-26 DIAGNOSIS — E10.65 TYPE 1 DIABETES MELLITUS WITH HYPERGLYCEMIA: ICD-10-CM

## 2025-06-27 RX ORDER — INSULIN PMP CART,AUT,G6/7,CNTR
EACH SUBCUTANEOUS
Qty: 15 EACH | Refills: 0 | Status: SHIPPED | OUTPATIENT
Start: 2025-06-27